# Patient Record
Sex: MALE | Race: BLACK OR AFRICAN AMERICAN | NOT HISPANIC OR LATINO
[De-identification: names, ages, dates, MRNs, and addresses within clinical notes are randomized per-mention and may not be internally consistent; named-entity substitution may affect disease eponyms.]

---

## 2022-07-08 ENCOUNTER — DASHBOARD ENCOUNTERS (OUTPATIENT)
Age: 62
End: 2022-07-08

## 2022-07-08 ENCOUNTER — WEB ENCOUNTER (OUTPATIENT)
Dept: URBAN - METROPOLITAN AREA CLINIC 17 | Facility: CLINIC | Age: 62
End: 2022-07-08

## 2022-07-08 ENCOUNTER — OFFICE VISIT (OUTPATIENT)
Dept: URBAN - METROPOLITAN AREA CLINIC 17 | Facility: CLINIC | Age: 62
End: 2022-07-08
Payer: COMMERCIAL

## 2022-07-08 VITALS
SYSTOLIC BLOOD PRESSURE: 134 MMHG | DIASTOLIC BLOOD PRESSURE: 100 MMHG | TEMPERATURE: 99 F | WEIGHT: 254 LBS | HEART RATE: 65 BPM | HEIGHT: 73 IN | BODY MASS INDEX: 33.66 KG/M2

## 2022-07-08 DIAGNOSIS — Z86.010 PERSONAL HISTORY OF COLONIC POLYPS: ICD-10-CM

## 2022-07-08 PROBLEM — 59621000: Status: ACTIVE | Noted: 2022-07-08

## 2022-07-08 PROBLEM — 248311001: Status: ACTIVE | Noted: 2022-07-08

## 2022-07-08 PROCEDURE — 99244 OFF/OP CNSLTJ NEW/EST MOD 40: CPT | Performed by: INTERNAL MEDICINE

## 2022-07-08 PROCEDURE — 99203 OFFICE O/P NEW LOW 30 MIN: CPT | Performed by: INTERNAL MEDICINE

## 2022-07-08 NOTE — HPI-TODAY'S VISIT:
The patient with history of HTN and stage 3 CKD, who presents for evaluation of a screening colon . The pt notes that he had a TIA multiple times in the past  The pt notes that he has struggled with recent weight gain due to non-compliance. The pt denies melena, hematemesis or hematochezia. The pt denies tobacco or alcohol abuse. The pt had a colon in past revealing colon polyps.   The patient's consultatoin note will be sent to the referring MD, Dr. Fannie Cohen.

## 2022-07-12 PROBLEM — 700378005: Status: ACTIVE | Noted: 2022-07-08

## 2022-07-13 PROBLEM — 428283002: Status: ACTIVE | Noted: 2022-07-08

## 2022-09-07 ENCOUNTER — OFFICE VISIT (OUTPATIENT)
Dept: URBAN - METROPOLITAN AREA SURGERY CENTER 16 | Facility: SURGERY CENTER | Age: 62
End: 2022-09-07

## 2022-11-23 ENCOUNTER — OFFICE VISIT (OUTPATIENT)
Dept: URBAN - METROPOLITAN AREA SURGERY CENTER 16 | Facility: SURGERY CENTER | Age: 62
End: 2022-11-23

## 2023-09-29 ENCOUNTER — OFFICE VISIT (OUTPATIENT)
Dept: URBAN - METROPOLITAN AREA CLINIC 17 | Facility: CLINIC | Age: 63
End: 2023-09-29